# Patient Record
Sex: FEMALE | URBAN - METROPOLITAN AREA
[De-identification: names, ages, dates, MRNs, and addresses within clinical notes are randomized per-mention and may not be internally consistent; named-entity substitution may affect disease eponyms.]

---

## 2023-07-24 ENCOUNTER — OFFICE VISIT (OUTPATIENT)
Dept: URBAN - METROPOLITAN AREA CLINIC 107 | Facility: CLINIC | Age: 38
End: 2023-07-24

## 2023-09-28 ENCOUNTER — WEB ENCOUNTER (OUTPATIENT)
Dept: URBAN - METROPOLITAN AREA CLINIC 107 | Facility: CLINIC | Age: 38
End: 2023-09-28

## 2023-10-02 ENCOUNTER — OFFICE VISIT (OUTPATIENT)
Dept: URBAN - METROPOLITAN AREA CLINIC 107 | Facility: CLINIC | Age: 38
End: 2023-10-02

## 2024-01-08 ENCOUNTER — OFFICE VISIT (OUTPATIENT)
Dept: URBAN - METROPOLITAN AREA CLINIC 107 | Facility: CLINIC | Age: 39
End: 2024-01-08

## 2024-02-02 ENCOUNTER — OV NP (OUTPATIENT)
Dept: URBAN - METROPOLITAN AREA CLINIC 107 | Facility: CLINIC | Age: 39
End: 2024-02-02
Payer: COMMERCIAL

## 2024-02-02 VITALS
HEIGHT: 67 IN | BODY MASS INDEX: 19.46 KG/M2 | DIASTOLIC BLOOD PRESSURE: 93 MMHG | HEART RATE: 93 BPM | TEMPERATURE: 96.9 F | SYSTOLIC BLOOD PRESSURE: 159 MMHG | WEIGHT: 124 LBS

## 2024-02-02 DIAGNOSIS — R10.84 GENERALIZED ABDOMINAL PAIN: ICD-10-CM

## 2024-02-02 DIAGNOSIS — R63.4 WEIGHT LOSS: ICD-10-CM

## 2024-02-02 DIAGNOSIS — K92.1 HEMATOCHEZIA: ICD-10-CM

## 2024-02-02 DIAGNOSIS — R19.7 DIARRHEA, UNSPECIFIED TYPE: ICD-10-CM

## 2024-02-02 PROCEDURE — 99244 OFF/OP CNSLTJ NEW/EST MOD 40: CPT

## 2024-02-02 PROCEDURE — 99204 OFFICE O/P NEW MOD 45 MIN: CPT

## 2024-02-02 RX ORDER — CHLORTHALIDONE 25 MG/1
TAKE ONE TABLET BY MOUTH EVERY OTHER DAY FOR 90 DAYS TABLET ORAL
Qty: 45 UNSPECIFIED | Refills: 0 | Status: ACTIVE | COMMUNITY

## 2024-02-02 RX ORDER — DEXTROAMPHETAMINE SACCHARATE, AMPHETAMINE ASPARTATE, DEXTROAMPHETAMINE SULFATE, AND AMPHETAMINE SULFATE 7.5; 7.5; 7.5; 7.5 MG/1; MG/1; MG/1; MG/1
1 TABLET TABLET ORAL TWICE A DAY
Status: ACTIVE | COMMUNITY

## 2024-02-02 RX ORDER — LEVOTHYROXINE SODIUM 100 UG/1
TAKE ONE CAPSULE BY MOUTH ONE TIME DAILY BEFORE A MEAL CAPSULE ORAL
Qty: 30 UNSPECIFIED | Refills: 0 | Status: ACTIVE | COMMUNITY

## 2024-02-02 NOTE — HPI-TODAY'S VISIT:
This is a 38-year-old woman with a history of hypothyroidism, hypertension, ADHD, and trigeminal neuralgia she was referred by Dr. Javy Rider for evaluation of hematochezia.  A copy of today's visit will be forwarded to referring provider.  She was previously referred in May 2023.  But has been unable to keep appointment until today.  She was seen twice at [facility] ED at [facility] ED for complaints of abdominal pain.  Her first visit on 6/8/2023 she was experiencing chest pain abdominal pain.  Her labs available for review from this visit show low sodium 135, elevated glucose 107, low BUN 8, urinalysis with trace ketones otherwise normal, elevated white blood cell count 11.57, low hemoglobin 11.1, platelet count 362,000.  Her CT abdomen pelvis with contrast showed colonic constipation and mild small bowel stasis pattern.  Left ovarian cyst, 16 mm, and mild left adnexa free fluid she also had chest x-ray that showed no acute process.  Pelvic ultrasound of the left ovarian cyst 1.7 cm, otherwise unremarkable pelvic ultrasound with pelvic vascular study.  According to the ED reports she was awaiting stool study results with her gastroenterologist Dr. Mclean.  Was currently taken Augmentin for other infection.  She was discharged with recommendation to follow-up with all of her providers and specialist.  She then had a subsequent visit on 6/18/2023 complaints of lower abdominal pain and reports of 20 formed bowel movements daily and nausea.  She had just finished azithromycin x 7 days for Campylobacter prescribed by Dr. Mclean.  Her labs from this visit showed hemoglobin low at 10.9, low MCHC 30.9, platelet count 398,000 otherwise unremarkable labs.  Her CT abdomen pelvis with contrast showed no acute intra-abdominal process.  It was recommended that she follow-up with Dr. Holder.  Her most recent labs available for review from 1/16/2024 showed normal CBC, normal PT/INR and APTT, low potassium 3.3, low BUN 8-month negative fecal calprotectin, stool studies positive for Campylobacter. She reports in April experienced what she suspects was food poisoning. She thought it would subside, but she continued experiencing multiple loose and urgent bowel movements daily. She also had a colposcopy and subsequent hemorrhage. She has been extremely stressed throughout this process as she is also going through a divorce. She reports she was functioning but was dealing with fatigue. She was feeling much better in June after treatment with azithromycin. But that she was dealing with anemia. She reports she can't take iron supplement due to side effects. Had isolated episode of blood in her stool in October. She has started fiber, though inconsistent regimen, that results in formed bowel movements.